# Patient Record
Sex: MALE | URBAN - NONMETROPOLITAN AREA
[De-identification: names, ages, dates, MRNs, and addresses within clinical notes are randomized per-mention and may not be internally consistent; named-entity substitution may affect disease eponyms.]

---

## 2024-08-06 ENCOUNTER — TRANSCRIBE ORDERS (OUTPATIENT)
Dept: ADMINISTRATIVE | Facility: HOSPITAL | Age: 54
End: 2024-08-06

## 2024-08-22 ENCOUNTER — TRANSCRIBE ORDERS (OUTPATIENT)
Dept: ADMINISTRATIVE | Facility: HOSPITAL | Age: 54
End: 2024-08-22

## 2024-08-22 DIAGNOSIS — Z87.891 PERSONAL HISTORY OF TOBACCO USE, PRESENTING HAZARDS TO HEALTH: ICD-10-CM

## 2024-08-22 DIAGNOSIS — Z12.2 SCREENING FOR LUNG CANCER: Primary | ICD-10-CM

## 2024-09-06 ENCOUNTER — HOSPITAL ENCOUNTER (OUTPATIENT)
Dept: CT IMAGING | Facility: HOSPITAL | Age: 54
Discharge: HOME | End: 2024-09-06
Admitting: FAMILY MEDICINE
Payer: COMMERCIAL

## 2024-09-06 DIAGNOSIS — Z87.891 PERSONAL HISTORY OF TOBACCO USE, PRESENTING HAZARDS TO HEALTH: ICD-10-CM

## 2024-09-06 DIAGNOSIS — Z12.2 SCREENING FOR LUNG CANCER: ICD-10-CM

## 2024-09-06 PROCEDURE — 71271 CT THORAX LUNG CANCER SCR C-: CPT

## 2025-07-02 ENCOUNTER — OFFICE VISIT (OUTPATIENT)
Dept: PULMONOLOGY | Facility: CLINIC | Age: 55
End: 2025-07-02
Payer: COMMERCIAL

## 2025-07-02 VITALS
DIASTOLIC BLOOD PRESSURE: 68 MMHG | RESPIRATION RATE: 16 BRPM | SYSTOLIC BLOOD PRESSURE: 124 MMHG | HEART RATE: 68 BPM | BODY MASS INDEX: 31.54 KG/M2 | WEIGHT: 238 LBS | OXYGEN SATURATION: 99 % | HEIGHT: 73 IN

## 2025-07-02 DIAGNOSIS — Z87.891 PERSONAL HISTORY OF NICOTINE DEPENDENCE: ICD-10-CM

## 2025-07-02 DIAGNOSIS — J44.9 CHRONIC OBSTRUCTIVE PULMONARY DISEASE, UNSPECIFIED COPD TYPE: Primary | ICD-10-CM

## 2025-07-02 DIAGNOSIS — G47.31 CENTRAL SLEEP APNEA: ICD-10-CM

## 2025-07-02 DIAGNOSIS — G47.33 OBSTRUCTIVE SLEEP APNEA: ICD-10-CM

## 2025-07-02 RX ORDER — CETIRIZINE HYDROCHLORIDE 10 MG/1
10 TABLET ORAL DAILY
COMMUNITY
Start: 2025-05-19

## 2025-07-02 RX ORDER — FLUTICASONE FUROATE, UMECLIDINIUM BROMIDE AND VILANTEROL TRIFENATATE 100; 62.5; 25 UG/1; UG/1; UG/1
POWDER RESPIRATORY (INHALATION)
COMMUNITY
Start: 2025-06-17 | End: 2025-07-02

## 2025-07-02 RX ORDER — ALBUTEROL SULFATE 90 UG/1
2 INHALANT RESPIRATORY (INHALATION) EVERY 6 HOURS PRN
COMMUNITY
Start: 2025-05-19 | End: 2025-08-17

## 2025-07-02 RX ORDER — FLUTICASONE PROPIONATE 50 MCG
1 SPRAY, SUSPENSION (ML) NASAL
COMMUNITY
Start: 2025-05-19 | End: 2025-08-17

## 2025-07-02 NOTE — PROGRESS NOTES
CONSULT NOTE    Requested by:   Mike Amaya MD Fowles, Thomas W, MD      Chief Complaint   Patient presents with    Sleeping Problem    Consult       Subjective:  Neil Bernabe is a 54 y.o. male.   Patient was evaluated today for shortness of breath and history of MCK[place].     Patient says that his symptoms have been diagnosed as COPD many years ago.       Patient is using Trelegy, as prescribed, and was started 4-6 months ago. Exercise tolerance has also remained stable    Used to smoke 1 PPD for about 20 years. Quit 10 years ago.    The patient says that  he was diagnosed with sleep apnea 16 years ago.      It appears that he has been on a PAP device since then.     he feels that the PAP device occasionally does not function properly.     Patient does report some initial improvement but now feels that he is once again feeling tired in the morning and occasionally has noticed excessive daytime sleepiness as well.      Patient is aware of snoring through the device.     The patient describes no significant issues with his mask either.       The following portions of the patient's history were reviewed and updated as appropriate: allergies, current medications, past family history, past medical history, past social history, and past surgical history.    Review of Systems   HENT:  Positive for sinus pressure. Negative for sneezing and sore throat.    Respiratory:  Positive for chest tightness. Negative for cough, shortness of breath and wheezing.    Cardiovascular:  Negative for palpitations and leg swelling.   Psychiatric/Behavioral:  Positive for sleep disturbance.    All other systems reviewed and are negative.      Past Medical History:   Diagnosis Date    COPD (chronic obstructive pulmonary disease)     Sleep apnea, obstructive        Social History     Tobacco Use    Smoking status: Former     Current packs/day: 0.00     Average packs/day: 1 pack/day for 20.0 years (20.0 ttl pk-yrs)     Types:  "Cigarettes     Start date: 7/2/1995     Quit date: 7/2/2015     Years since quitting: 10.0    Smokeless tobacco: Not on file   Substance Use Topics    Alcohol use: Not on file         Objective:  Visit Vitals  /68   Pulse 68   Resp 16   Ht 185.4 cm (73\") Comment: pt reported   Wt 108 kg (238 lb)   SpO2 99%   BMI 31.40 kg/m²       BMI Readings from Last 8 Encounters:   07/02/25 31.40 kg/m²       Physical Exam  Vitals reviewed.   Constitutional:       Appearance: He is well-developed.   HENT:      Head: Normocephalic and atraumatic.      Mouth/Throat:      Comments: Oropharynx was crowded.  Eyes:      Extraocular Movements: Extraocular movements intact.   Cardiovascular:      Rate and Rhythm: Normal rate.   Pulmonary:      Comments: Somewhat hyperresonant to percussion.  Somewhat decreased air entry.  No obvious wheezing noted.   Musculoskeletal:      Cervical back: Neck supple.   Neurological:      Mental Status: He is alert and oriented to person, place, and time.             Assessment/Plan:  Diagnoses and all orders for this visit:    1. Chronic obstructive pulmonary disease, unspecified COPD type (Primary)  -     Complete PFT - Pre & Post Bronchodilator; Future    2. Personal history of nicotine dependence  -      CT Chest Low Dose Cancer Screening WO; Future    3. Obstructive sleep apnea  -     Polysomnography 4 or More Parameters With Titration; Future    4. Central sleep apnea  Comments:  H/O  Orders:  -     Polysomnography 4 or More Parameters With Titration; Future    Other orders  -     Fluticasone-Umeclidin-Vilant (TRELEGY) 100-62.5-25 MCG/ACT inhaler; Inhale 1 puff Daily. Rinse mouth with water after use.  Dispense: 1 each; Refill: 5        Return in about 5 months (around 12/2/2025) for Recheck, Imaging, PFT F/U, For [facility] ([place]).    DISCUSSION(if any):  Last CT scan results was reviewed in great detail with the patient.  Results for orders placed during the hospital encounter of " 09/06/24    CT Chest Low Dose Cancer Screening WO    Narrative  PROCEDURE: CT CHEST LOW DOSE CANCER SCREENING WO-    HISTORY: ; Z12.2-Encounter for screening for malignant neoplasm of  respiratory organs; Z87.891-Personal history of nicotine dependence    COMPARISON: NONE    TECHNIQUE: Axial images were obtained from the thoracic inlet through  the upper abdomen per the low-dose protocol. Coronal images were  reformatted. DLP 50.48 mGy.cm; CTDI 1.21 mGy    FINDINGS: There is centrilobular emphysema. There is evidence of old  calcified granulomatous disease. There are 3 mm or less noncalcified  nodules are seen in the right lung. The heart is normal in size. The  aorta is normal in caliber. There are no pleural or pericardial  effusions. The visualized upper abdomen is unremarkable. Bone windows  reveal no acute osseous abnormalities.    Impression  Small noncalcified right lung nodules. Lung RADS category 2.    RECOMMENDATIONS:Annual low-dose chest CT.        Images were reviewed, interpreted, and dictated by Dr. Albertina Santos MD  Transcribed by Rebecca Key PA-C.    This report was signed and finalized on 9/6/2024 10:35 AM by Albertina Santos MD.        Laboratory workup also showed   Lab Results   Component Value Date    HGB 15.4 12/08/2022   ,   Lab Results   Component Value Date    HCT 44.7 12/08/2022       Lab Results   Component Value Date    EOSABS 0.2 12/08/2022    & Laboratory workup also showed   Lab Results   Component Value Date    HGBA1C 5.8 (H) 03/19/2025      ===========================  ===========================    I was able to review the results of last home sleep study in detail. It was performed in Oct 2021.        Current PAP Settings: ???  Current mask: FFM    I told the patient that his symptoms are consistent with partially controlled sleep apnea.    Patient was advised to continue using PAP for at least 4 hours every night.    If the symptoms do not improve, even after above-mentioned  measures, then he may have to undergo a full night titration study, especially with history of central apnea.     Patient was advised to call this office with any issues.    Orders as above    Based on history and physical exam, it seems that his shortness of breath is most likely from obstructive lung disease.     Patient was educated on compliance with, and the correct method of using the pulmonary medicines.     Side effects, of prescribed medicines, discussed.    I have told him that we will made further recommendations, based on clinical response.     The patient belongs to the risk group for which lung cancer screening has been recommended.  This will be indicated in Sept 2025. This has been ordered     Patient was given reading material, as appropriate.     I have encouraged the patient to call or schedule a visit earlier, if there are any concerns.        Dictated utilizing Dragon dictation.    This document was electronically signed by Lakshmi Marinelli MD on 07/02/25 at 11:25 EDT